# Patient Record
Sex: FEMALE | URBAN - METROPOLITAN AREA
[De-identification: names, ages, dates, MRNs, and addresses within clinical notes are randomized per-mention and may not be internally consistent; named-entity substitution may affect disease eponyms.]

---

## 2024-05-07 RX ORDER — CETIRIZINE HYDROCHLORIDE 5 MG/1
10 TABLET ORAL 2 TIMES DAILY
COMMUNITY

## 2024-05-08 ENCOUNTER — ANESTHESIA EVENT (OUTPATIENT)
Dept: SURGERY | Facility: AMBULATORY SURGERY CENTER | Age: 34
End: 2024-05-08

## 2024-05-09 ENCOUNTER — HOSPITAL ENCOUNTER (OUTPATIENT)
Facility: AMBULATORY SURGERY CENTER | Age: 34
Discharge: HOME | End: 2024-05-09
Attending: OBSTETRICS & GYNECOLOGY

## 2024-05-09 ENCOUNTER — ANESTHESIA (OUTPATIENT)
Dept: SURGERY | Facility: AMBULATORY SURGERY CENTER | Age: 34
End: 2024-05-09

## 2024-05-09 VITALS
BODY MASS INDEX: 24.24 KG/M2 | RESPIRATION RATE: 16 BRPM | OXYGEN SATURATION: 100 % | TEMPERATURE: 98 F | HEIGHT: 64 IN | WEIGHT: 142 LBS | SYSTOLIC BLOOD PRESSURE: 99 MMHG | HEART RATE: 68 BPM | DIASTOLIC BLOOD PRESSURE: 57 MMHG

## 2024-05-09 DIAGNOSIS — O03.4 INCOMPLETE ABORTION: ICD-10-CM

## 2024-05-09 RX ORDER — ONDANSETRON 2 MG/ML
4 INJECTION INTRAMUSCULAR; INTRAVENOUS EVERY 6 HOURS PRN
Status: DISCONTINUED | OUTPATIENT
Start: 2024-05-09 | End: 2024-05-10 | Stop reason: HOSPADM

## 2024-05-09 RX ORDER — FENTANYL CITRATE 50 UG/ML
INJECTION, SOLUTION INTRAMUSCULAR; INTRAVENOUS PRN
Status: DISCONTINUED | OUTPATIENT
Start: 2024-05-09 | End: 2024-05-09

## 2024-05-09 RX ORDER — LIDOCAINE HYDROCHLORIDE 20 MG/ML
INJECTION, SOLUTION INFILTRATION; PERINEURAL PRN
Status: DISCONTINUED | OUTPATIENT
Start: 2024-05-09 | End: 2024-05-09

## 2024-05-09 RX ORDER — DEXAMETHASONE SODIUM PHOSPHATE 4 MG/ML
INJECTION, SOLUTION INTRA-ARTICULAR; INTRALESIONAL; INTRAMUSCULAR; INTRAVENOUS; SOFT TISSUE PRN
Status: DISCONTINUED | OUTPATIENT
Start: 2024-05-09 | End: 2024-05-09

## 2024-05-09 RX ORDER — ONDANSETRON 2 MG/ML
4 INJECTION INTRAMUSCULAR; INTRAVENOUS EVERY 30 MIN PRN
Status: DISCONTINUED | OUTPATIENT
Start: 2024-05-09 | End: 2024-05-10 | Stop reason: HOSPADM

## 2024-05-09 RX ORDER — PROPOFOL 10 MG/ML
INJECTION, EMULSION INTRAVENOUS PRN
Status: DISCONTINUED | OUTPATIENT
Start: 2024-05-09 | End: 2024-05-09

## 2024-05-09 RX ORDER — ACETAMINOPHEN 325 MG/1
975 TABLET ORAL ONCE
Status: COMPLETED | OUTPATIENT
Start: 2024-05-09 | End: 2024-05-09

## 2024-05-09 RX ORDER — LIDOCAINE HYDROCHLORIDE 10 MG/ML
INJECTION, SOLUTION EPIDURAL; INFILTRATION; INTRACAUDAL; PERINEURAL PRN
Status: DISCONTINUED | OUTPATIENT
Start: 2024-05-09 | End: 2024-05-09 | Stop reason: HOSPADM

## 2024-05-09 RX ORDER — IBUPROFEN 800 MG/1
800 TABLET, FILM COATED ORAL ONCE
Status: DISCONTINUED | OUTPATIENT
Start: 2024-05-09 | End: 2024-05-10 | Stop reason: HOSPADM

## 2024-05-09 RX ORDER — BERBERINE CHLOR/SEAWEED/CHROM 500-250 MG
CAPSULE ORAL
COMMUNITY

## 2024-05-09 RX ORDER — MULTIVIT-MIN/IRON/FOLIC ACID/K 18-600-40
CAPSULE ORAL
COMMUNITY

## 2024-05-09 RX ORDER — FLUTICASONE PROPIONATE 50 MCG
1 SPRAY, SUSPENSION (ML) NASAL DAILY
COMMUNITY

## 2024-05-09 RX ORDER — OXYCODONE HYDROCHLORIDE 10 MG/1
10 TABLET ORAL
Status: DISCONTINUED | OUTPATIENT
Start: 2024-05-09 | End: 2024-05-10 | Stop reason: HOSPADM

## 2024-05-09 RX ORDER — ACETAMINOPHEN 325 MG/1
975 TABLET ORAL ONCE
Status: DISCONTINUED | OUTPATIENT
Start: 2024-05-09 | End: 2024-05-10 | Stop reason: HOSPADM

## 2024-05-09 RX ORDER — FENTANYL CITRATE 0.05 MG/ML
25 INJECTION, SOLUTION INTRAMUSCULAR; INTRAVENOUS
Status: DISCONTINUED | OUTPATIENT
Start: 2024-05-09 | End: 2024-05-10 | Stop reason: HOSPADM

## 2024-05-09 RX ORDER — NALOXONE HYDROCHLORIDE 0.4 MG/ML
0.1 INJECTION, SOLUTION INTRAMUSCULAR; INTRAVENOUS; SUBCUTANEOUS
Status: DISCONTINUED | OUTPATIENT
Start: 2024-05-09 | End: 2024-05-10 | Stop reason: HOSPADM

## 2024-05-09 RX ORDER — OXYCODONE HYDROCHLORIDE 5 MG/1
5 TABLET ORAL
Status: DISCONTINUED | OUTPATIENT
Start: 2024-05-09 | End: 2024-05-10 | Stop reason: HOSPADM

## 2024-05-09 RX ORDER — SODIUM CHLORIDE, SODIUM LACTATE, POTASSIUM CHLORIDE, CALCIUM CHLORIDE 600; 310; 30; 20 MG/100ML; MG/100ML; MG/100ML; MG/100ML
INJECTION, SOLUTION INTRAVENOUS CONTINUOUS
Status: DISCONTINUED | OUTPATIENT
Start: 2024-05-09 | End: 2024-05-10 | Stop reason: HOSPADM

## 2024-05-09 RX ORDER — PROPOFOL 10 MG/ML
INJECTION, EMULSION INTRAVENOUS CONTINUOUS PRN
Status: DISCONTINUED | OUTPATIENT
Start: 2024-05-09 | End: 2024-05-09

## 2024-05-09 RX ORDER — ONDANSETRON 4 MG/1
4 TABLET, ORALLY DISINTEGRATING ORAL EVERY 30 MIN PRN
Status: DISCONTINUED | OUTPATIENT
Start: 2024-05-09 | End: 2024-05-10 | Stop reason: HOSPADM

## 2024-05-09 RX ORDER — DOXYCYCLINE 100 MG/1
200 CAPSULE ORAL ONCE
Status: COMPLETED | OUTPATIENT
Start: 2024-05-09 | End: 2024-05-09

## 2024-05-09 RX ORDER — LIDOCAINE 40 MG/G
CREAM TOPICAL
Status: DISCONTINUED | OUTPATIENT
Start: 2024-05-09 | End: 2024-05-10 | Stop reason: HOSPADM

## 2024-05-09 RX ORDER — KETOROLAC TROMETHAMINE 30 MG/ML
INJECTION, SOLUTION INTRAMUSCULAR; INTRAVENOUS PRN
Status: DISCONTINUED | OUTPATIENT
Start: 2024-05-09 | End: 2024-05-09

## 2024-05-09 RX ORDER — DEXAMETHASONE SODIUM PHOSPHATE 4 MG/ML
4 INJECTION, SOLUTION INTRA-ARTICULAR; INTRALESIONAL; INTRAMUSCULAR; INTRAVENOUS; SOFT TISSUE
Status: DISCONTINUED | OUTPATIENT
Start: 2024-05-09 | End: 2024-05-10 | Stop reason: HOSPADM

## 2024-05-09 RX ADMIN — ONDANSETRON 4 MG: 2 INJECTION INTRAMUSCULAR; INTRAVENOUS at 10:27

## 2024-05-09 RX ADMIN — DEXAMETHASONE SODIUM PHOSPHATE 4 MG: 4 INJECTION, SOLUTION INTRA-ARTICULAR; INTRALESIONAL; INTRAMUSCULAR; INTRAVENOUS; SOFT TISSUE at 10:50

## 2024-05-09 RX ADMIN — FENTANYL CITRATE 50 MCG: 50 INJECTION, SOLUTION INTRAMUSCULAR; INTRAVENOUS at 10:43

## 2024-05-09 RX ADMIN — PROPOFOL 50 MG: 10 INJECTION, EMULSION INTRAVENOUS at 10:44

## 2024-05-09 RX ADMIN — DOXYCYCLINE 200 MG: 100 CAPSULE ORAL at 10:27

## 2024-05-09 RX ADMIN — Medication 100 MCG: at 10:50

## 2024-05-09 RX ADMIN — ACETAMINOPHEN 975 MG: 325 TABLET ORAL at 10:04

## 2024-05-09 RX ADMIN — KETOROLAC TROMETHAMINE 15 MG: 30 INJECTION, SOLUTION INTRAMUSCULAR; INTRAVENOUS at 11:07

## 2024-05-09 RX ADMIN — LIDOCAINE HYDROCHLORIDE 3 ML: 20 INJECTION, SOLUTION INFILTRATION; PERINEURAL at 10:43

## 2024-05-09 RX ADMIN — SODIUM CHLORIDE, SODIUM LACTATE, POTASSIUM CHLORIDE, CALCIUM CHLORIDE: 600; 310; 30; 20 INJECTION, SOLUTION INTRAVENOUS at 10:14

## 2024-05-09 RX ADMIN — PROPOFOL 160 MCG/KG/MIN: 10 INJECTION, EMULSION INTRAVENOUS at 10:44

## 2024-05-09 NOTE — ANESTHESIA CARE TRANSFER NOTE
Patient: Linda Vaca    Procedure: Procedure(s):  SUCTION DILATION AND CURETTAGE       Diagnosis: Incomplete  [O03.4]  Diagnosis Additional Information: No value filed.    Anesthesia Type:   MAC     Note:    Oropharynx: oropharynx clear of all foreign objects and spontaneously breathing  Level of Consciousness: drowsy  Oxygen Supplementation: room air    Independent Airway: airway patency satisfactory and stable  Dentition: dentition unchanged  Vital Signs Stable: post-procedure vital signs reviewed and stable  Report to RN Given: handoff report given  Patient transferred to: Phase II    Handoff Report: Identifed the Patient, Identified the Reponsible Provider, Reviewed the pertinent medical history, Discussed the surgical course, Reviewed Intra-OP anesthesia mangement and issues during anesthesia, Set expectations for post-procedure period and Allowed opportunity for questions and acknowledgement of understanding      Vitals:  Vitals Value Taken Time   BP 97/55 24 1118   Temp 98  F (36.7  C) 24 1115   Pulse 75 24 1121   Resp 16 24 1115   SpO2 99 % 24 1121   Vitals shown include unfiled device data.    Electronically Signed By: ADILIA Moseley CRNA  May 9, 2024  11:23 AM

## 2024-05-09 NOTE — ANESTHESIA POSTPROCEDURE EVALUATION
Patient: Linda Vaca    Procedure: Procedure(s):  SUCTION DILATION AND CURETTAGE       Anesthesia Type:  MAC    Note:  Disposition: Outpatient   Postop Pain Control: Uneventful            Sign Out: Well controlled pain   PONV: No   Neuro/Psych: Uneventful            Sign Out: Acceptable/Baseline neuro status   Airway/Respiratory: Uneventful            Sign Out: Acceptable/Baseline resp. status   CV/Hemodynamics: Uneventful            Sign Out: Acceptable CV status; No obvious hypovolemia; No obvious fluid overload   Other NRE: NONE   DID A NON-ROUTINE EVENT OCCUR? No           Last vitals:  Vitals Value Taken Time   BP 99/53 05/09/24 1130   Temp 98  F (36.7  C) 05/09/24 1115   Pulse 72 05/09/24 1133   Resp 16 05/09/24 1130   SpO2 99 % 05/09/24 1133   Vitals shown include unfiled device data.    Electronically Signed By: Martir Segura MD  May 9, 2024  11:35 AM

## 2024-05-09 NOTE — H&P
History and Physical GYN    NAME:Linda Vaca  : 1990  MRN: [id number]    Admission Date: 2024    PCP:  No primary care provider on file.     CHIEF COMPLAINT:  Incomplete  [O03.4]    HPI:  Linda Vaca is a 34 year old  female who presents today for suction D&C indicated for incomplete . Underwent a medication induced  in January and [facility]. Her bHCG was noted to stabilize and never became negative and she was sent for referral to our office. US confirmed large amount of retained products. History is obtained through the patient.  Today she reports no changes, has had no bleeding since last being seen.    PMH:  Past Medical History:   Diagnosis Date    Arthritis     Difficulty walking     Other chronic pain     Thyroid disease     Walking troubles        PSH:  Past Surgical History:   Procedure Laterality Date    ORTHOPEDIC SURGERY         Social History:  Social History     Socioeconomic History    Marital status: Not on file     Spouse name: Not on file    Number of children: Not on file    Years of education: Not on file    Highest education level: Not on file   Occupational History    Not on file   Tobacco Use    Smoking status: Never    Smokeless tobacco: Never   Substance and Sexual Activity    Alcohol use: Not Currently    Drug use: Not Currently    Sexual activity: Not on file   Other Topics Concern    Not on file   Social History Narrative    Not on file     Social Determinants of Health     Financial Resource Strain: Not on file   Food Insecurity: Not on file   Transportation Needs: Not on file   Physical Activity: Not on file   Stress: Not on file   Social Connections: Not on file   Interpersonal Safety: Not on file   Housing Stability: Not on file       Medications:  Current Outpatient Medications   Medication Sig Dispense Refill    Ashwagandha (ASHWAGANDHA 35) 120 MG CAPS Ashwagandha      cetirizine (ZYRTEC) 5 MG  "tablet Take 10 mg by mouth 2 times daily      Cholecalciferol (VITAMIN D) 50 MCG (2000 UT) CAPS Vitamin D      fluticasone (FLONASE) 50 MCG/ACT nasal spray Spray 1 spray into both nostrils daily       Current Facility-Administered Medications   Medication Dose Route Frequency Provider Last Rate Last Admin    acetaminophen (TYLENOL) tablet 975 mg  975 mg Oral Once Kayla Tay MD        doxycycline (VIBRAMYCIN) 200 mg in sodium chloride 0.9 % 250 mL intermittent infusion  200 mg Intravenous Pre-Op/Pre-procedure x 1 dose Kayla Tay MD        doxycycline hyclate (VIBRAMYCIN) capsule 200 mg  200 mg Oral Once Kayla Tay MD        lactated ringers infusion   Intravenous Continuous Geovanny Salguero  mL/hr at 05/09/24 1014 New Bag at 05/09/24 1014    lidocaine (LMX4) kit   Topical Q1H PRN Geovanny Salguero MD        lidocaine 1 % 0.1-1 mL  0.1-1 mL Other Q1H PRN Geovanny Salguero MD        ondansetron (ZOFRAN) injection 4 mg  4 mg Intravenous Q6H PRN Kayla Tay MD        sodium chloride (PF) 0.9% PF flush 3 mL  3 mL Intracatheter Q8H Geovanny Salguero MD        sodium chloride (PF) 0.9% PF flush 3 mL  3 mL Intracatheter q1 min prn Geovanny Salguero MD        sterile water (bottle) irrigation    PRN Kayla Tay MD   500 mL at 05/09/24 1019       Allergies:  Allergies   Allergen Reactions    Misoprostol Swelling    Bacitracin Rash       Review of Systems   Negative except what is stated in the HPI    Physical exam:  /65   Pulse 80   Temp 97.7  F (36.5  C) (Temporal)   Resp 16   Ht 1.613 m (5' 3.5\")   Wt 64.4 kg (142 lb)   LMP 11/27/2023 (Approximate)   SpO2 100%   BMI 24.76 kg/m       General Appearance: Alert, appropriate appearance for age. No acute distress,   HEENT Exam: Grossly normal.  Chest/Respiratory Exam: CTAB  Cardiovascular Exam: RRR  Musculoskeletal Exam: full ROM of upper and lower extremities.,   Skin: no rash or abnormalities,   Neurologic Exam: Normal gait and " speech, no tremor  Psychiatric Exam: Alert and oriented, appropriate affect.      Pertinent Labs  Blood type O+    Imaging:   Pelvic US 24: Uterus 9.37 x 8.27 x 6.25cm, endo thickness 26.26, thickened and heterogeneosu with vascularity, hypoechoic fluid collection with debris seen in the inferior endometrium measuring 3.0 x 3.6 x 1.6cm- suspect gestational sac with debris- no clear fetal pole seen, bilateral ovaries unremarkable    ASSESSMENT/PLAN:  Proceed with suction D&C for indication of retained products of conception/incomplete . The risks, indications, and alternatives were discussed with the patient.  I explained that the risks include, but are not limited to, bleeding and possible need for transfusion, infection, damage to adjacent structures including the bladder, ureters, bowel, or major vessels, DVT/PE, death and anesthetic risks.  I also explained that there is a chance that the symptoms may not improve after the surgical intervention.  She seems to understand and wishes to proceed.    Kayla Tay MD

## 2024-05-09 NOTE — OP NOTE
PROCEDURE: SUCTION D & C    DATE OF SERVICE: 2024    PREOPERATIVE DIAGNOSIS: Incomplete     POSTOPERATIVE DIAGNOSIS: Same    PROCEDURE: Suction dilatation and curettage    SURGEON(S): Kayla Tay MD    ANESTHESIA: MAC and local    ESTIMATED BLOOD LOSS: Minimal    SPECIMENS: Uterine contents, sent to pathology    COMPLICATIONS: None.     HISTORY OF PRESENT ILLNESS:  This is a 34 year old female with a known missed . The risks, benefits and alternatives to the procedure were discussed with her at length.  She expressed understanding and wished to proceed.     OPERATIVE FINDINGS:  Moderate amount of products of conception     PROCEDURE NOTE:  Patient was brought to the operating room and after induction of anesthesia was prepped and draped in the dorsal lithotomy position.  A time out was called and the patient and the procedure were verified.  A sterile speculum was placed.  The anterior lip of the cervix was grasped with a single tooth tenaculum. A paracervical block was placed with 10cc of 1% lidocaine at the 4 and 8 o'clock positions.  Stephanie cervical dilators were used to dilate the cervix.  A # 9 suction curette was induced and rotated to clear the uterus of all products of conception.  A sharp curette was introduced and one gentle pass confirmed gritty texture circumferentially. One additional pass of the suction curette was performed. Once it was felt that all tissue was removed a decision was made to terminate the procedure. The tenaculum was removed and oozing was noted. Hemostasis was confirmed with silver nitrate, pressure, and monsel's. Sponge and instrument counts were correct.  Patient tolerated the procedure well and was taken to the recovery room in good condition.    Kayla Tay MD  Ob/Gyn  [facility]

## 2024-05-09 NOTE — ANESTHESIA PREPROCEDURE EVALUATION
"Anesthesia Pre-Procedure Evaluation    Patient: Linda Vaca   MRN: [id number] : 1990        Procedure : Procedure(s):  SUCTION DILATION AND CURETTAGE          Past Medical History:   Diagnosis Date    Arthritis     Difficulty walking     Other chronic pain     Thyroid disease     Walking troubles       Past Surgical History:   Procedure Laterality Date    ORTHOPEDIC SURGERY        Allergies   Allergen Reactions    Misoprostol Swelling    Bacitracin Rash      Social History     Tobacco Use    Smoking status: Never    Smokeless tobacco: Never   Substance Use Topics    Alcohol use: Not Currently      Wt Readings from Last 1 Encounters:   24 64.4 kg (142 lb)        Anesthesia Evaluation   Pt has had prior anesthetic.     No history of anesthetic complications       ROS/MED HX  ENT/Pulmonary:  - neg pulmonary ROS   (+)           allergic rhinitis,                             Neurologic:  - neg neurologic ROS     Cardiovascular:  - neg cardiovascular ROS     METS/Exercise Tolerance: >4 METS    Hematologic:  - neg hematologic  ROS     Musculoskeletal:  - neg musculoskeletal ROS (+)  arthritis,             GI/Hepatic:  - neg GI/hepatic ROS     Renal/Genitourinary:  - neg Renal ROS     Endo:  - neg endo ROS     Psychiatric/Substance Use:  - neg psychiatric ROS     Infectious Disease:  - neg infectious disease ROS     Malignancy:  - neg malignancy ROS     Other:  - neg other ROS          Physical Exam    Airway        Mallampati: II   TM distance: > 3 FB   Neck ROM: full   Mouth opening: > 3 cm    Respiratory Devices and Support         Dental       (+) Minor Abnormalities - some fillings, tiny chips    B=Bridge, C=Chipped, L=Loose, M=Missing    Cardiovascular   cardiovascular exam normal          Pulmonary   pulmonary exam normal                OUTSIDE LABS:  CBC: No results found for: \"WBC\", \"HGB\", \"HCT\", \"PLT\"  BMP: No results found for: \"NA\", \"POTASSIUM\", \"CHLORIDE\", \"CO2\", \"BUN\", \"CR\", \"GLC\"  COAGS: No " "results found for: \"PTT\", \"INR\", \"FIBR\"  POC: No results found for: \"BGM\", \"HCG\", \"HCGS\"  HEPATIC: No results found for: \"ALBUMIN\", \"PROTTOTAL\", \"ALT\", \"AST\", \"GGT\", \"ALKPHOS\", \"BILITOTAL\", \"BILIDIRECT\", \"MARGO\"  OTHER: No results found for: \"PH\", \"LACT\", \"A1C\", \"JONG\", \"PHOS\", \"MAG\", \"LIPASE\", \"AMYLASE\", \"TSH\", \"T4\", \"T3\", \"CRP\", \"SED\"    Anesthesia Plan    ASA Status:  2    NPO Status:  NPO Appropriate    Anesthesia Type: MAC.     - Reason for MAC: immobility needed, straight local not clinically adequate   Induction: Propofol.   Maintenance: TIVA.        Consents    Anesthesia Plan(s) and associated risks, benefits, and realistic alternatives discussed. Questions answered and patient/representative(s) expressed understanding.     - Discussed:     - Discussed with:  Patient            Postoperative Care    Pain management: Multi-modal analgesia.   PONV prophylaxis: Ondansetron (or other 5HT-3), Dexamethasone or Solumedrol     Comments:    Other Comments: Toradol if OK with surgeon    Reviewed anesthetic options and risks. Patient agrees to proceed.            Martir Segura MD    I have reviewed the pertinent notes and labs in the chart from the past 30 days and (re)examined the patient.  Any updates or changes from those notes are reflected in this note.                  "

## 2024-05-09 NOTE — DISCHARGE INSTRUCTIONS
If you have any questions or concerns regarding your procedure, please contact Dr. Tay, her office number is [phone].    You received 975 mg of acetaminophen (Tylenol) at 10:04 am. Please do not take an additional dose of Tylenol until after 4:04 pm.    Do not exceed 4,000 mg of acetaminophen in a 24 hour period, keep in mind that acetaminophen can also be found in many over-the-counter cold medications as well as narcotics that may be given for pain.    You received a medication called Toradol (a stronger IV ibuprofen) at 11:07 am. Do NOT take any Ibuprofen / Advil / Motrin / Aleve / Naproxen or products containing Ibuprofen until 5:07 pm or later.    D & C INSTRUCTION SHEET    The cervix does not immediately close after a D&C, so it is important to follow these instructions to avoid an infection or other complications.    1.  Bleeding may be irregular after the procedure.  A few women do not bleed at all following the procedure, which is normal for them. A solution was applied to your cervix for hemostasis that often results in coffee brown or mustard yellow vaginal discharge for a few days post-operatively. This is normal and expected.    2.  Your next normal period should begin in 2-8 weeks  It is possible to get pregnant before your first period begins.  Therefore you should use birth control if pregnancy is undesirable.    3.  You may have some cramping, similar to menstrual cramps.  If so, you may take your usual pain medication (Ibuprofen and/or Tylenol).  If this is not sufficient, you should call our office.    4.  You may experience discomfort in your legs.  This is due to the position of your legs in the stirrups during the surgery.    5.  If you have fever over 101.5 degrees for more than a few hours, call our office.    6.  You may eat or drink anything that you like and return to your usual activities.  Be guided by how you feel.    7.  You may return to work in 1-2 days.    8.  Avoid  tampons, douching or sexual intercourse for 2 weeks.    9.  You may shower or tub bathe.    10. If you have heavy bleeding (saturating more than 2 pads in 1 hour) or pass large clots, please call the office.    FOLLOW-UP:     1. Please make a follow-up appointment to be seen in the office w/ Dr. Tay in 2 weeks after your surgery  2. If you have any questions, please do not hesitate to call your physician.      Adult Discharge Orders & Instructions     For 24 hours after surgery    Get plenty of rest.  A responsible adult must stay with you for at least 24 hours after you leave the hospital.     Do not drive or use heavy equipment.  If you have weakness or tingling, don't drive or use heavy equipment until this feeling goes away.    Do not drink alcohol.    Avoid strenuous or risky activities.  Ask for help when climbing stairs.     You may feel lightheaded.  If so, sit for a few minutes before standing.  Have someone help you get up.     You may have a slight fever. Call the doctor if your fever is over 101.5  F (38.6  C) (taken under the tongue) or lasts longer than 24 hours.    You may have a dry mouth, a sore throat, muscle aches or trouble sleeping.  These should go away after 24 hours.    Do not make important or legal decisions.    Based on the surgery/procedure that you had today, we do not expect that you will have any problems.  However, we want you to know what to do if you have pain, nausea, bleeding, or infection:    To control pain:  Take medicines your physician has prescribed or over-the counter medicine he or she advises.  Ice packs and periods of rest are often helpful.  For surgery on an arm or leg, raise it on a pillow to ease swelling.  If your pain is not managed with the above methods, contact your physician.        To control nausea:  Take anti-nausea medicine approved by your physician.  Drink clear liquids such as apple juice, ginger ale, broth or 7-Up. Be sure to drink enough fluids.  Move  to a regular diet as you feel able.  Rest may also help.    Bleeding:  You may see a little blood on your dressing, about the size of a quarter in the first 24 hours.  If you see this, there is no reason to be alarmed.  However, if this continues to increase in size, apply pressure if able, and notify your physician.        Infection: Please contact your physician if you have any of the following signs:  redness, swelling, heat, increasing pain or foul-smelling drainage at your surgery site, fever or chills.    Call your doctor for any of the followin.  It has been over 8 to 10 hours since surgery and you are still not able to urinate (pass water).    2.  Headache for over 24 hours.    3.  Numbness, tingling or weakness in your legs the day after surgery (if you had spinal anesthesia).